# Patient Record
Sex: FEMALE | Race: WHITE | URBAN - METROPOLITAN AREA
[De-identification: names, ages, dates, MRNs, and addresses within clinical notes are randomized per-mention and may not be internally consistent; named-entity substitution may affect disease eponyms.]

---

## 2018-05-10 ENCOUNTER — HOSPITAL ENCOUNTER (OUTPATIENT)
Dept: PHYSICAL THERAPY | Age: 58
Discharge: HOME | End: 2018-05-10
Payer: COMMERCIAL

## 2018-05-10 PROCEDURE — 97161 PT EVAL LOW COMPLEX 20 MIN: CPT

## 2018-05-10 PROCEDURE — 97110 THERAPEUTIC EXERCISES: CPT

## 2018-05-10 NOTE — PROGRESS NOTES
[address] [place] [address] PHYSICAL THERAPY   [facility] 51, [facility] 201,[facility], [facility] - Phone: [phone]  Fax: [phone] / [address] [facility]  Patient Name: Chin Carbone : 1960   Medical   Diagnosis: Low back pain [M54.5] Treatment Diagnosis: Low back pain [M54.5]   Onset Date: 18     Referral Source: Lamar Alcocer MD [facility]): 5/10/2018   Prior Hospitalization: See medical history Provider #: [id number]   Prior Level of Function: I with ADL's   Comorbidities: None Listed   Medications: Verified on Patient Summary List   The Plan of Care and following information is based on the information from the initial evaluation.   ==================================================================================   Chin Carbone is a 59 y.o.  yo female with Dx: Low back pain [M54.5]. Pt reports pain occurred on 18 while are work getting on and off a step stool. Pt went to a minor emergency care following the injury at work and was given tramodol and steroid pack. Pt was able to work intermittently due to pain the next few days, but had severe increase in pain over the weekend. Pt had increased pain causing her to have to call the ambulance to take her to the ER due to being unable to perform any motion other then laying flat. Pt noted increased relief from the pain medications she received and followed up with ortho a few days after. Xrays were performed but pt unaware of the result. Pt has MRI pending at this time. Pt followed up with ortho and was placed on 5lb lifting restrictions atwork. Pt rates pain as 9/10 max, 0/10 at best, 0/10 today. Pain better with laying flat and use of pain medication. Pain worse with sitting, standing, and walking. Difficulty with 3 stairs getting in and out of the house. N/T L hip/groin and R knee but no symptoms in the lower leg. Objective: FOTO score = 63 (an established functional score where 100 = no disability). Posture demonstrated decreased L/S lordosis. Gait WNL. Palpation TTP in the R piriformis, BL L/S para and QL. AROM of the L/S decreased by 50% in all directions. Strength 5/5 in the Lower extremities. Decreased core strength. Hip AROM WNL in all directions with tightness with R hip IR/ER. Special Tests: - slump, - SLR. Symmetrical pevic alignment at this time. The patient was instructed in a home exercise program to address the above findings/deficits. The patient would benefit from skilled physical therapy at this time to address the above functional deficits.   ==================================================================================  Eval Complexity: History LOW Complexity : Zero comorbidities / personal factors that will impact the outcome / POC;  Examination  HIGH Complexity : 4+ Standardized tests and measures addressing body structure, function, activity limitation and / or participation in recreation ; Presentation MEDIUM Complexity : Evolving with changing characteristics ; Decision Making MEDIUM Complexity : FOTO score of 26-74; Overall Complexity LOW   Problem List: pain affecting function, decrease ROM, decrease strength, decrease ADL/ functional abilitiies, decrease activity tolerance, decrease flexibility/ joint mobility and decrease transfer abilities   Treatment Plan may include any combination of the following: Therapeutic exercise, Therapeutic activities, Neuromuscular re-education, Physical agent/modality, Manual therapy, Patient education and Functional mobility training  Patient / Family readiness to learn indicated by: asking questions, trying to perform skills and interest  Persons(s) to be included in education: patient (P)  Barriers to Learning/Limitations: None  Measures taken:    Patient Goal (s): \"improve pain\"   Patient self reported health status: good  Rehabilitation Potential: good   Short Term Goals:  To be accomplished in  6  treatments:  1. Pt to demonstrate independence with HEP to improve functional mobility for ADLs. 2. Pt will report 50% overall improvement with sitting and standing tolerance in order to improve functional ability to perform ADL's.  Long Term Goals: To be accomplished in  12  treatments:  1. Improve FOTO outcome score by 10-15% in order to indicate a significant improvement in ADL function. 2. Pt to report +5 or > on GROC to improve functional mobility for ADLs. 3. Pt to demonstrate l/s AROM WFL to improve functional mobility for ADLs. 4. Pt will report 75% overall improvement in functional ADL's in order to return to PLOF. 5. Patient will be independent with long term HEP in order to prepare for DC to home. Frequency / Duration:   Patient to be seen  2  times per week for 12  treatments:  Patient / Caregiver education and instruction: exercises  G-Codes (GP): MARA  Therapist Signature: Pablo Waters PT, DPT   Date: 2/98/6649   Certification Period: NA Time: 6:44 AM   ==================================================================================  I certify that the above Physical Therapy Services are being furnished while the patient is under my care. I agree with the treatment plan and certify that this therapy is necessary. Physician Signature:        Date:       Time:     Please sign and return to [facility] [place]. or you may fax the signed copy to [phone]. Thank you.

## 2018-05-10 NOTE — PROGRESS NOTES
PHYSICAL THERAPY - DAILY TREATMENT NOTE    Patient Name: Ciara Zendejas        Date: 5/10/2018  : 1960   yes Patient  Verified  Visit #:     Insurance: Payor: Mike Gavin / Plan: [employer] / Product Type: HMO /      In time: 3123 Out time: 100   Total Treatment Time: 55     Medicare Time Tracking (below)   Total Timed Codes (min):  na 1:1 Treatment Time:  na     TREATMENT AREA =  Low back pain [M54.5]    SUBJECTIVE  Pain Level (on 0 to 10 scale):  0  / 10   Medication Changes/New allergies or changes in medical history, any new surgeries or procedures?    no  If yes, update Summary List   Subjective Functional Status/Changes:  []  No changes reported     SEE POC         OBJECTIVE    15 min Therapeutic Exercise:  [x]  See flow sheet   Rationale:      increase ROM to improve the patients ability to perform functional ADL's      min Patient Education:  yes  Established HEP   []  Progressed/Changed HEP based on: Other Objective/Functional Measures:    SEE POC     Post Treatment Pain Level (on 0 to 10) scale:   0  / 10     ASSESSMENT  Assessment/Changes in Function:     Evaluation Code Complexity: History LOW Complexity : Zero comorbidities / personal factors that will impact the outcome / POC; Examination HIGH Complexity : 4+ Standardized tests and measures addressing body structure, function, activity limitation and / or participation in recreation ; Presentation MEDIUM Complexity : Evolving with changing characteristics ; Decision Making MEDIUM Complexity : FOTO score of 26-74;  Complexity LOW     Justification for Eval Code Complexity:  Patient History : none  Examination SEE ABOVE EXAM  Clinical Presentation: evolving pain  Clinical Decision Making : UWTW 09         []  See Progress Note/Recertification   Patient will continue to benefit from skilled PT services to modify and progress therapeutic interventions, address functional mobility deficits, address ROM deficits, address strength deficits, analyze and address soft tissue restrictions, analyze and cue movement patterns, analyze and modify body mechanics/ergonomics and assess and modify postural abnormalities to attain remaining goals. Progress toward goals / Updated goals:         PLAN  []  Upgrade activities as tolerated yes Continue plan of care   []  Discharge due to :    [x]  Other: Pt will be seen 2 times per week for 12 sessions.       Therapist: Brianda Yen PT, DPT    Date: 5/10/2018 Time: 6:44 AM     Future Appointments  Date Time Provider Elen Pascal   5/10/2018 12:00 PM Madeline Padron PT [facility]

## 2018-05-14 ENCOUNTER — HOSPITAL ENCOUNTER (OUTPATIENT)
Dept: PHYSICAL THERAPY | Age: 58
End: 2018-05-14
Payer: COMMERCIAL

## 2018-05-16 ENCOUNTER — HOSPITAL ENCOUNTER (OUTPATIENT)
Dept: PHYSICAL THERAPY | Age: 58
Discharge: HOME | End: 2018-05-16
Payer: COMMERCIAL

## 2018-05-16 PROCEDURE — 97140 MANUAL THERAPY 1/> REGIONS: CPT

## 2018-05-16 PROCEDURE — 97110 THERAPEUTIC EXERCISES: CPT

## 2018-05-16 PROCEDURE — 97014 ELECTRIC STIMULATION THERAPY: CPT

## 2018-05-16 NOTE — PROGRESS NOTES
PHYSICAL THERAPY - DAILY TREATMENT NOTE    Patient Name: Charon Lennox        Date: 2018  : 1960   YES Patient  Verified  Visit #:     Insurance: Payor: Deette Nissen / Plan: [employer] H[employer] / Product Type: HMO /      In time: 1100 Out time: 1155   Total Treatment Time: 55     Medicare Time Tracking (below)   Total Timed Codes (min):   1:1 Treatment Time:       TREATMENT AREA =   Low back pain [M54.5]    SUBJECTIVE  Pain Level (on 0 to 10 scale):  5  / 10   Medication Changes/New allergies or changes in medical history, any new surgeries or procedures? NO    If yes, update Summary List   Subjective Functional Status/Changes:  []  No changes reported     Reports no back pain, has (L) hip pain with symptoms radiating down the leg,  No right side pain. At night the pain gets worse. The hip is really \"achy\" right now. Been trying to do the exercises when I can.          OBJECTIVE  Modalities Rationale:     decrease inflammation and decrease pain to improve patient's ability to perform functional mobility activities with decrease c/o symptoms  10 min [x] Estim, type/location:  IFC (L) glute                                    []  att     [x]  unatt     []  w/US     [x]  w/ice    []  w/heat    min []  Mechanical Traction: type/lbs                   []  pro   []  sup   []  int   []  cont    []  before manual    []  after manual    min []  Ultrasound, settings/location:      min []  Iontophoresis w/ dexamethasone, location:                                               []  take home patch       []  in clinic    min []  Ice     []  Heat    location/position:     min []  Vasopneumatic Device, press/temp:     min []  Other:    [] Skin assessment post-treatment (if applicable):    []  intact    []  redness- no adverse reaction     []redness  adverse reaction:        30 min Therapeutic Exercise:  [x]  See flow sheet   Rationale:      increase ROM and increase strength to improve the patients ability to perform functional mobility activities with decrease c/o symptoms       15 min Manual Therapy: DTM TPR glute/piriformis, piriformis stretch, TFL release. Rationale:      decrease pain, increase ROM and increase tissue extensibility to improve patient's ability to perform functional mobility activities with decrease c/o symptoms     min Patient Education:  YES  Reviewed HEP   []  Progressed/Changed HEP based on: Other Objective/Functional Measures:    No change in functional measurements today. Post Treatment Pain Level (on 0 to 10) scale:   5  / 10     ASSESSMENT  Assessment/Changes in Function:     Overall good tolerance to all therapeutic interventions for first treatment session  significant TTP at (L) hip and TFL regions. patient instructed to use ice for radicular pain, MHP for muscle cramping. []  See Progress Note/Recertification   Patient will continue to benefit from skilled PT services to modify and progress therapeutic interventions, address functional mobility deficits, address ROM deficits, address strength deficits, analyze and address soft tissue restrictions and analyze and cue movement patterns to attain remaining goals. Progress toward goals / Updated goals:    No change toward goals today.      PLAN  []  Upgrade activities as tolerated YES Continue plan of care   []  Discharge due to :    []  Other:      Therapist: KEITH Dasilva    Date: 5/16/2018 Time: 6:52 AM       Future Appointments  Date Time Provider Elen Pascal   5/16/2018 11:00 AM Shu Joyner [facility]   5/21/2018 11:00 AM Kat Gilmore [facility]   5/23/2018 10:30 AM Shu Joyner [facility]   5/30/2018 11:00 AM Shu Joyner [facility]   6/1/2018 11:00 AM Kat Gilmore [facility]   6/4/2018 11:00 AM Shu Joyner [facility]   6/6/2018 11:00 AM Shu Joyner [facility]

## 2018-05-18 ENCOUNTER — HOSPITAL ENCOUNTER (OUTPATIENT)
Dept: PHYSICAL THERAPY | Age: 58
Discharge: HOME | End: 2018-05-18
Payer: COMMERCIAL

## 2018-05-18 PROCEDURE — 97110 THERAPEUTIC EXERCISES: CPT

## 2018-05-18 PROCEDURE — 97140 MANUAL THERAPY 1/> REGIONS: CPT

## 2018-05-18 PROCEDURE — 97014 ELECTRIC STIMULATION THERAPY: CPT

## 2018-05-18 NOTE — PROGRESS NOTES
PHYSICAL THERAPY - DAILY TREATMENT NOTE    Patient Name: Ciara Zendejas        Date: 2018  : 1960   YES Patient  Verified  Visit #:   3   of   12  Insurance: Payor: Mike Gavin / Plan: [id number] HMO / Product Type: HMO /      In time: 700 Out time: 800   Total Treatment Time: 60     Medicare Time Tracking (below)   Total Timed Codes (min):   1:1 Treatment Time:       TREATMENT AREA =  Low back pain [M54.5]    SUBJECTIVE  Pain Level (on 0 to 10 scale):  5  / 10   Medication Changes/New allergies or changes in medical history, any new surgeries or procedures? NO    If yes, update Summary List   Subjective Functional Status/Changes:  []  No changes reported     Still having that nerve pain on the left hip at night that just wakes me up. But the day isn't so bad. I actually fetl pretty good after last session, but yesterday ti was getting aggitated again.          OBJECTIVE  Modalities Rationale:     decrease inflammation and decrease pain to improve patient's ability toperform functional mobility activities with decrease c/o symptoms  10 min [x] Estim, type/location: IFC (L) glute                                     []  att     [x]  unatt     []  w/US     [x]  w/ice    []  w/heat    min []  Mechanical Traction: type/lbs                   []  pro   []  sup   []  int   []  cont    []  before manual    []  after manual    min []  Ultrasound, settings/location:      min []  Iontophoresis w/ dexamethasone, location:                                               []  take home patch       []  in clinic    min []  Ice     []  Heat    location/position:     min []  Vasopneumatic Device, press/temp:     min []  Other:    [x] Skin assessment post-treatment (if applicable):    [x]  intact    []  redness- no adverse reaction     []redness  adverse reaction:        35 min Therapeutic Exercise:  [x]  See flow sheet   Rationale:      increase ROM and increase strength to improve the patients ability to perform functional mobility activities with decrease c/o symptoms    15 min Manual Therapy: DTM TPR glute/piriformis, piriformis stretch, TFL release. Rationale:      decrease pain, increase ROM and increase tissue extensibility to improve patient's ability to perform functional mobility activities with decrease c/o symptoms     min Patient Education:  YES  Reviewed HEP   []  Progressed/Changed HEP based on: Other Objective/Functional Measures:    Continued TTP of (L) hip musculature.  godd release of tension in (L) hip with TPR. Post Treatment Pain Level (on 0 to 10) scale:   4  / 10     ASSESSMENT  Assessment/Changes in Function:     No change in function, continues to report increase night pain which wakes her up. []  See Progress Note/Recertification   Patient will continue to benefit from skilled PT services to modify and progress therapeutic interventions, address functional mobility deficits, address ROM deficits, address strength deficits, analyze and address soft tissue restrictions and analyze and cue movement patterns to attain remaining goals.    Progress toward goals / Updated goals:    No change toward goals today     PLAN  []  Upgrade activities as tolerated YES Continue plan of care   []  Discharge due to :    []  Other:      Therapist: KEITH Vee    Date: 5/18/2018 Time: 6:11 AM       Future Appointments  Date Time Provider Elen Pascal   5/18/2018 7:00 AM Mckenzie Hines [facility]   5/21/2018 11:00 AM Ananya Varela [facility]   5/23/2018 10:30 AM Mckenzie Hines [facility]   5/30/2018 11:00 AM Mckenzie Hines [facility]   6/1/2018 11:00 AM Ananya Varela [facility]   6/4/2018 11:00 AM Mckenzie Hines [facility]   6/6/2018 11:00 AM Mckenzie Hines [facility]

## 2018-05-21 ENCOUNTER — HOSPITAL ENCOUNTER (OUTPATIENT)
Dept: PHYSICAL THERAPY | Age: 58
Discharge: HOME | End: 2018-05-21
Payer: COMMERCIAL

## 2018-05-21 PROCEDURE — 97140 MANUAL THERAPY 1/> REGIONS: CPT

## 2018-05-21 PROCEDURE — 97110 THERAPEUTIC EXERCISES: CPT

## 2018-05-21 PROCEDURE — 97014 ELECTRIC STIMULATION THERAPY: CPT

## 2018-05-21 NOTE — PROGRESS NOTES
PHYSICAL THERAPY - DAILY TREATMENT NOTE    Patient Name: Leeann Essex        Date: 2018  : 1960   YES Patient  Verified  Visit #:     Insurance: Payor: Anjana Hayes / Plan: [employer] HMO / Product Type: HMO /      In time: 11 Out time: 12   Total Treatment Time: 60     Medicare Time Tracking (below)   Total Timed Codes (min):  60 1:1 Treatment Time:       TREATMENT AREA =  Low back pain [M54.5]    SUBJECTIVE  Pain Level (on 0 to 10 scale):  5  / 10   Medication Changes/New allergies or changes in medical history, any new surgeries or procedures? NO    If yes, update Summary List   Subjective Functional Status/Changes:  []  No changes reported     Pt is reporting a lot of pain over the weekend. States she has been sore since Friday and hasn't done anything out of the ordinary since then. OBJECTIVE  Modalities Rationale:     decrease inflammation and decrease pain to improve patient's ability to perform pain free ADLs. 10 min [x] Estim, type/location: Supine, Lumbar.                                      []  att     [x]  unatt     []  w/US     [x]  w/ice    []  w/heat    min []  Mechanical Traction: type/lbs                   []  pro   []  sup   []  int   []  cont    []  before manual    []  after manual    min []  Ultrasound, settings/location:      min []  Iontophoresis w/ dexamethasone, location:                                               []  take home patch       []  in clinic    min []  Ice     []  Heat    location/position:     min []  Vasopneumatic Device, press/temp:     min []  Other:    [x] Skin assessment post-treatment (if applicable):    [x]  intact    []  redness- no adverse reaction     []redness  adverse reaction:        35 min Therapeutic Exercise:  [x]  See flow sheet   Rationale:      increase ROM, increase strength, improve coordination and improve balance to improve the patients ability to perform pain free ADLs.       15 min Manual Therapy: TPR (L) lumbar paraspinals,  QL, glute/piriformis. Rationale:      decrease pain, increase ROM, increase tissue extensibility and decrease trigger points to improve patient's ability to perform pain free ADLs. min Patient Education:  YES  Reviewed HEP   []  Progressed/Changed HEP based on: Other Objective/Functional Measures:    Progressed to deadbug to improve core strength and stability for ADLs. See flow sheet. Post Treatment Pain Level (on 0 to 10) scale:   4 / 10     ASSESSMENT  Assessment/Changes in Function:     Pt demonstrating compliance with HEP.  (L) hip pain throughout session. TTP (L) lumbar paraspinals and QL.      []  See Progress Note/Recertification   Patient will continue to benefit from skilled PT services to modify and progress therapeutic interventions, address functional mobility deficits, address ROM deficits, address strength deficits, analyze and address soft tissue restrictions, analyze and cue movement patterns, analyze and modify body mechanics/ergonomics and assess and modify postural abnormalities to attain remaining goals. Progress toward goals / Updated goals:    No change in progress toward LTG's with today's session.       PLAN  [x]  Upgrade activities as tolerated YES Continue plan of care   []  Discharge due to :    []  Other:      Therapist: Namita Parker PTA    Date: 5/21/2018 Time: 11:19 AM     Future Appointments  Date Time Provider Elen Pascal   5/23/2018 10:30 AM Neeta Grimes PTA [facility]   5/30/2018 11:00 AM Neeta Grimes PTA [facility]   6/1/2018 11:00 AM Namita Parker PTA [facility]   6/4/2018 11:00 AM Neeta Grimes PTA [facility]   6/6/2018 11:00 AM Neeta Grimes PTA [facility]

## 2018-05-23 ENCOUNTER — HOSPITAL ENCOUNTER (OUTPATIENT)
Dept: PHYSICAL THERAPY | Age: 58
Discharge: HOME | End: 2018-05-23
Payer: COMMERCIAL

## 2018-05-23 PROCEDURE — 97014 ELECTRIC STIMULATION THERAPY: CPT

## 2018-05-23 PROCEDURE — 97110 THERAPEUTIC EXERCISES: CPT

## 2018-05-23 PROCEDURE — 97140 MANUAL THERAPY 1/> REGIONS: CPT

## 2018-05-23 NOTE — PROGRESS NOTES
PHYSICAL THERAPY - DAILY TREATMENT NOTE    Patient Name: Janak Garcia        Date: 2018  : 1960   YES Patient  Verified  Visit #:     Insurance: Payor: Shruti Encinas / Plan: [employer] / Product Type: HMO /      In time: 9691 Out time:    Total Treatment Time: 60     Medicare Time Tracking (below)   Total Timed Codes (min):   1:1 Treatment Time:       TREATMENT AREA =  Low back pain [M54.5]    SUBJECTIVE  Pain Level (on 0 to 10 scale):  3 / 10   Medication Changes/New allergies or changes in medical history, any new surgeries or procedures? NO    If yes, update Summary List   Subjective Functional Status/Changes:  []  No changes reported     I puma up for like 2-3 hours and it felt pretty good. I fell better today than I have in a while.           OBJECTIVE  Modalities Rationale:     decrease inflammation and decrease pain to improve patient's ability to perform functional mobility activities with decrease c/o symptoms  10 min [] Estim, type/location:  IFC (L) glute                                      []  att     []  unatt     []  w/US     []  w/ice    []  w/heat    min []  Mechanical Traction: type/lbs                   []  pro   []  sup   []  int   []  cont    []  before manual    []  after manual    min []  Ultrasound, settings/location:      min []  Iontophoresis w/ dexamethasone, location:                                               []  take home patch       []  in clinic    min []  Ice     []  Heat    location/position:     min []  Vasopneumatic Device, press/temp:     min []  Other:    [] Skin assessment post-treatment (if applicable):    []  intact    []  redness- no adverse reaction     []redness  adverse reaction:        35 min Therapeutic Exercise:  [x]  See flow sheet   Rationale:      increase ROM and increase strength to improve the patients ability to perform functional mobility activities with decrease c/o symptoms     15 min Manual Therapy: DTM TPR glute/piriformis, piriformis stretch, TFL release. Rationale:      decrease pain, increase ROM and increase tissue extensibility to improve patient's ability to perform functional mobility activities with decrease c/o symptoms     min Patient Education:  YES  Reviewed HEP   []  Progressed/Changed HEP based on: Other Objective/Functional Measures:    Decrease muscle tension throughout (L) hip. Continued TTP along (L) TFL into ITband region     Post Treatment Pain Level (on 0 to 10) scale:   2 / 10     ASSESSMENT  Assessment/Changes in Function:     Reporting decrease pain with sitting for prolonged periods. Reports improved tolerance and less discomfort while ambulating. []  See Progress Note/Recertification   Patient will continue to benefit from skilled PT services to modify and progress therapeutic interventions, address functional mobility deficits, address ROM deficits, address strength deficits, analyze and address soft tissue restrictions and analyze and cue movement patterns to attain remaining goals. Progress toward goals / Updated goals:    Progressing steadily with pain reduction and functional mobility activities.      PLAN  []  Upgrade activities as tolerated YES Continue plan of care   []  Discharge due to :    []  Other:      Therapist: KEITH Orr    Date: 5/23/2018 Time: 6:55 AM       Future Appointments  Date Time Provider Elen Pascal   5/23/2018 10:30 AM [facility]   5/30/2018 11:00 AM [facility]   6/1/2018 11:00 AM [facility]   6/4/2018 11:00 AM [facility]   6/6/2018 11:00 AM [facility]

## 2018-05-30 ENCOUNTER — HOSPITAL ENCOUNTER (OUTPATIENT)
Dept: PHYSICAL THERAPY | Age: 58
Discharge: HOME | End: 2018-05-30
Payer: COMMERCIAL

## 2018-05-30 PROCEDURE — 97110 THERAPEUTIC EXERCISES: CPT

## 2018-05-30 PROCEDURE — 97140 MANUAL THERAPY 1/> REGIONS: CPT

## 2018-05-30 NOTE — PROGRESS NOTES
PHYSICAL THERAPY - DAILY TREATMENT NOTE    Patient Name: Eli Rodriguez        Date: 2018  : 1960   YES Patient  Verified  Visit #:     Insurance: Payor: [employer] [place] / Plan: [employer]MO / Product Type: HMO /      In time: 1100 Out time: 1150   Total Treatment Time: 50     Medicare Time Tracking (below)   Total Timed Codes (min):   1:1 Treatment Time:       TREATMENT AREA =  Low back pain [M54.5]    SUBJECTIVE  Pain Level (on 0 to 10 scale):  0  / 10   Medication Changes/New allergies or changes in medical history, any new surgeries or procedures? NO    If yes, update Summary List   Subjective Functional Status/Changes:  []  No changes reported     Other than feeling really painful and sore after last session from all the work you did, i'm actually feeling good today,  I feel like I have a little bruising in my left butt, but I don't real have any pain. I do feel like I have this cramping in the front of my hip though. OBJECTIVE    35 min Therapeutic Exercise:  [x]  See flow sheet   Rationale:      increase ROM and increase strength to improve the patients ability to perform functional mobility activities with decrease c/o symptoms     15 min Manual Therapy: DTM TPR TFL release. MFR (L) iliopsoas. Hip flexor stretch. Rationale:      decrease pain, increase ROM and increase tissue extensibility to improve patient's ability to perform functional mobility activities with decrease c/o symptoms        min Patient Education:  YES  Reviewed HEP   []  Progressed/Changed HEP based on: Other Objective/Functional Measures:    Add: squat to mat 10 x, RTB rows/extension 10 x 5 seconds, pall of press RTB 10 x 5 seconds (B)  Patient instructed in self TPR with dense ball to (L) glute/piriformis and (L) TFL regions. Patient verbalized understanding and returned demonstration.      Post Treatment Pain Level (on 0 to 10) scale:   0  / 10 ASSESSMENT  Assessment/Changes in Function:     Patient reports that she has made significant improvement and has improved tolerance to performance of all functional mobility activities and general ADLs. []  See Progress Note/Recertification   Patient will continue to benefit from skilled PT services to modify and progress therapeutic interventions, address functional mobility deficits, address ROM deficits, address strength deficits, analyze and address soft tissue restrictions and analyze and cue movement patterns to attain remaining goals. Progress toward goals / Updated goals:    Progressing steadily toward pain reduction and performance of all functional mobility activities. · Short Term Goals: To be accomplished in  6  treatments:  1. Pt to demonstrate independence with HEP to improve functional mobility for ADLs. achieved  2.  Pt will report 50% overall improvement with sitting and standing tolerance in order to improve functional ability to perform ADL's.achieved     PLAN  []  Upgrade activities as tolerated YES Continue plan of care   []  Discharge due to :    []  Other:      Therapist: KEITH Branch    Date: 5/30/2018 Time: 8:49 AM       Future Appointments  Date Time Provider Elen Pascal   5/30/2018 11:00 AM Samira Anne PTA [facility]   6/1/2018 11:00 AM Crystal Harrison PTA [facility]   6/4/2018 11:00 AM Samira Anne PTA [facility]   6/6/2018 11:00 AM Samira Anne [facility]

## 2018-06-01 ENCOUNTER — HOSPITAL ENCOUNTER (OUTPATIENT)
Dept: PHYSICAL THERAPY | Age: 58
Discharge: HOME | End: 2018-06-01
Payer: COMMERCIAL

## 2018-06-01 PROCEDURE — 97140 MANUAL THERAPY 1/> REGIONS: CPT

## 2018-06-01 PROCEDURE — 97110 THERAPEUTIC EXERCISES: CPT

## 2018-06-01 NOTE — PROGRESS NOTES
PHYSICAL THERAPY - DAILY TREATMENT NOTE    Patient Name: Renetta Gordillo        Date: 2018  : 1960   YES Patient  Verified  Visit #:     Insurance: Payor: Suzie Gonzalez / Plan: [employer] HMO / Product Type: HMO /      In time: 11 Out time: 1145   Total Treatment Time: 45     Medicare Time Tracking (below)   Total Timed Codes (min):  45 1:1 Treatment Time:       TREATMENT AREA =  Low back pain [M54.5]    SUBJECTIVE  Pain Level (on 0 to 10 scale):  1  / 10   Medication Changes/New allergies or changes in medical history, any new surgeries or procedures? NO    If yes, update Summary List   Subjective Functional Status/Changes:  []  No changes reported     Pt reports steady improvement since she started PT.         OBJECTIVE    30 min Therapeutic Exercise:  [x]  See flow sheet   Rationale:      increase ROM, increase strength and improve coordination to improve the patients ability to perform pain free ADLs. 15 Min Manual Therapy: STM/DTM (L) glute/piriformis. Rationale:      decrease pain, increase ROM, increase tissue extensibility and decrease trigger points to improve patient's ability to perform pain free ADLs. min Patient Education:  YES  Reviewed HEP   []  Progressed/Changed HEP based on: Other Objective/Functional Measures: Therex per flow sheet. FOTO = 57   GROC = +6     Post Treatment Pain Level (on 0 to 10) scale:   0  / 10     ASSESSMENT  Assessment/Changes in Function:     No exacerbation of symptoms with today's session.       []  See Progress Note/Recertification   Patient will continue to benefit from skilled PT services to modify and progress therapeutic interventions, address functional mobility deficits, address ROM deficits, address strength deficits, analyze and address soft tissue restrictions, analyze and cue movement patterns, analyze and modify body mechanics/ergonomics and assess and modify postural abnormalities to attain remaining goals. Progress toward goals / Updated goals:    Progressing toward FOTO goals. GROC goals met.       PLAN  [x]  Upgrade activities as tolerated YES Continue plan of care   []  Discharge due to :    []  Other:      Therapist: Myranda Lechuga PTA    Date: 6/1/2018 Time: 2:10 PM     Future Appointments  Date Time Provider Elen Pascal   6/4/2018 11:00 AM Nancy Ulloa PTA [facility]   6/6/2018 11:00 AM Nancy Ulloa PTA [facility]

## 2018-06-04 ENCOUNTER — HOSPITAL ENCOUNTER (OUTPATIENT)
Dept: PHYSICAL THERAPY | Age: 58
Discharge: HOME | End: 2018-06-04
Payer: COMMERCIAL

## 2018-06-04 PROCEDURE — 97140 MANUAL THERAPY 1/> REGIONS: CPT

## 2018-06-04 PROCEDURE — 97110 THERAPEUTIC EXERCISES: CPT

## 2018-06-04 NOTE — PROGRESS NOTES
PHYSICAL THERAPY - DAILY TREATMENT NOTE    Patient Name: Jose M Livingston        Date: 2018  : 1960   YES Patient  Verified  Visit #:     Insurance: Payor: Jori Marquez / Plan: [employer] HMO / Product Type: HMO /      In time: 1100 Out time: 1150   Total Treatment Time: 50     Medicare Time Tracking (below)   Total Timed Codes (min):   1:1 Treatment Time:       TREATMENT AREA =   Low back pain [M54.5]    SUBJECTIVE  Pain Level (on 0 to 10 scale):  0  / 10   Medication Changes/New allergies or changes in medical history, any new surgeries or procedures? NO    If yes, update Summary List   Subjective Functional Status/Changes:  []  No changes reported     I can't do those squats because it really bothered my knees. Reporting decrease numbness along TFL and hip flexor,  Reports a lot of standing and moving about for about 6 hours cleaning her mom's house; increase of muscle achiness and fatigue. OBJECTIVE    40 min Therapeutic Exercise:  [x]  See flow sheet   Rationale:      increase ROM and increase strength to improve the patients ability to perform functional mobility activities with decrease c/o symptoms     10 min Manual Therapy: DTM TPR TFL release. MFR (L) iliopsoas. Hip flexor stretch. Rationale:      decrease pain, increase ROM and increase tissue extensibility to improve patient's ability to perform functional mobility activities with decrease c/o symptoms      min Patient Education:  YES  Reviewed HEP   []  Progressed/Changed HEP based on: Other Objective/Functional Measures:    Add: leg press 80# 10 x 2. Hip flexion stretch at step 3 x 15 seconds. Post Treatment Pain Level (on 0 to 10) scale:   0  / 10     ASSESSMENT  Assessment/Changes in Function:     Reports 95% overall improvement with all functional mobility activities and general ADLs.      []  See Progress Note/Recertification      Progress toward goals / Updated goals:    See DC note PLAN  []  Upgrade activities as tolerated no Continue plan of care   [x]  Discharge due to : Program completed, goals achieved.    []  Other:      Therapist: KEITH Skinner    Date: 6/4/2018 Time: 6:40 AM       Future Appointments  Date Time Provider Elen Pascal   6/4/2018 11:00 AM Дмитрий Julian PTA [facility]   6/6/2018 11:00 AM Дмитрий Julian PTA [facility]

## 2018-06-06 ENCOUNTER — APPOINTMENT (OUTPATIENT)
Dept: PHYSICAL THERAPY | Age: 58
End: 2018-06-06
Payer: COMMERCIAL

## 2018-07-17 NOTE — PROGRESS NOTES
[facility] PHYSICAL THERAPY   [facility] [address] [place] [address],[place] [facility] - Phone: [phone]  Fax: [phone] SUMMARY  Patient Name: Ciara Zendejas : 1960   Treatment/Medical Diagnosis: Low back pain [M54.5]     Referral Source: Krishna Fisher MD     Date of Initial Visit: 5/10/2018 Attended Visits: 8 Missed Visits: [facility] has been seen at our clinic 2-3x/wk for a total of 8 visits. Pt treatment has consisted of aerobic warm-up with treadmill, therapeutic exercise for lumbar ROM, hip/core strengthening, modalities prn and manual therapy (STM/DTM (L) glute/piriformis)    CURRENT STATUS  Pt has had a good tolerance to physical therapy treatment. Patient reported no difficulty with ambulating community distances or performing any of her daily functional mobility activities. Patient reports that she has made significant improvement and has improved tolerance to performance of all functional mobility activities and general ADLs. Patient also reported no significant increase of pain or discomfort. Overall pain reduced to 0 - 1/10. Patient instructed in self TPR with dense ball to (L) glute/piriformis and (L) TFL regions. Patient verbalized understanding and returned demonstration. No reports of radicular symptoms. (L) hip strength MMT WNL globally. Patient reported that she was compliant with her HEP. Goal/Measure of Progress Goal Met? 1. Improve FOTO outcome score by 10-15% in order to indicate a significant improvement in ADL function. Status at last Eval: 53 Current Status: 57 Slow progression   2. Pt to report +5 or > on GROC to improve functional mobility for ADLs. Status at last Eval:  Current Status: +6 a great deal better yes   3. Pt to demonstrate l/s AROM WFL to improve functional mobility for ADLs. Status at last Eval: reduced Current Status: WFL Yes     4.   Pt will report 75% overall improvement in functional ADL's in order to return to PLOF. Status at last Eval:  Current Status: Reports 95% overall improvement with all functional mobility activities and general ADLs. yes   5. Patient will be independent with long term HEP in order to prepare for DC to home. Status at last Eval:  Current Status: achieved yes       RECOMMENDATIONS  Discharge from physical therapy treatment with HEP. Specifics: program completed  If you have any questions/comments please contact us directly at [phone]. Thank you for allowing us to assist in the care of your patient.     LPTA Signature: Samira Anne, PTA Date: 7/17/2018   PT Signature: Jemima Padron, PT Time: 2:20 PM